# Patient Record
Sex: MALE | Race: AMERICAN INDIAN OR ALASKA NATIVE
[De-identification: names, ages, dates, MRNs, and addresses within clinical notes are randomized per-mention and may not be internally consistent; named-entity substitution may affect disease eponyms.]

---

## 2019-09-11 ENCOUNTER — HOSPITAL ENCOUNTER (OUTPATIENT)
Dept: HOSPITAL 5 - GIO | Age: 54
Discharge: HOME | End: 2019-09-11
Attending: INTERNAL MEDICINE
Payer: OTHER GOVERNMENT

## 2019-09-11 VITALS — DIASTOLIC BLOOD PRESSURE: 80 MMHG | SYSTOLIC BLOOD PRESSURE: 130 MMHG

## 2019-09-11 DIAGNOSIS — Z79.899: ICD-10-CM

## 2019-09-11 DIAGNOSIS — Z91.013: ICD-10-CM

## 2019-09-11 DIAGNOSIS — G47.30: ICD-10-CM

## 2019-09-11 DIAGNOSIS — E66.9: ICD-10-CM

## 2019-09-11 DIAGNOSIS — K64.8: ICD-10-CM

## 2019-09-11 DIAGNOSIS — Z79.84: ICD-10-CM

## 2019-09-11 DIAGNOSIS — D12.2: Primary | ICD-10-CM

## 2019-09-11 DIAGNOSIS — I10: ICD-10-CM

## 2019-09-11 DIAGNOSIS — Z88.8: ICD-10-CM

## 2019-09-11 DIAGNOSIS — K63.5: ICD-10-CM

## 2019-09-11 DIAGNOSIS — K31.89: ICD-10-CM

## 2019-09-11 DIAGNOSIS — Z80.0: ICD-10-CM

## 2019-09-11 DIAGNOSIS — K29.70: ICD-10-CM

## 2019-09-11 DIAGNOSIS — E11.9: ICD-10-CM

## 2019-09-11 DIAGNOSIS — K20.9: ICD-10-CM

## 2019-09-11 LAB
BASOPHILS # (AUTO): 0 K/MM3 (ref 0–0.1)
BASOPHILS NFR BLD AUTO: 0.3 % (ref 0–1.8)
EOSINOPHIL # BLD AUTO: 0.2 K/MM3 (ref 0–0.4)
EOSINOPHIL NFR BLD AUTO: 2.8 % (ref 0–4.3)
HCT VFR BLD CALC: 34.9 % (ref 35.5–45.6)
HGB BLD-MCNC: 11.9 GM/DL (ref 11.8–15.2)
LYMPHOCYTES # BLD AUTO: 1.9 K/MM3 (ref 1.2–5.4)
LYMPHOCYTES NFR BLD AUTO: 32.5 % (ref 13.4–35)
MCHC RBC AUTO-ENTMCNC: 34 % (ref 32–34)
MCV RBC AUTO: 84 FL (ref 84–94)
MONOCYTES # (AUTO): 0.4 K/MM3 (ref 0–0.8)
MONOCYTES % (AUTO): 7.3 % (ref 0–7.3)
PLATELET # BLD: 311 K/MM3 (ref 140–440)
RBC # BLD AUTO: 4.17 M/MM3 (ref 3.65–5.03)

## 2019-09-11 PROCEDURE — 82962 GLUCOSE BLOOD TEST: CPT

## 2019-09-11 PROCEDURE — 88342 IMHCHEM/IMCYTCHM 1ST ANTB: CPT

## 2019-09-11 PROCEDURE — 88305 TISSUE EXAM BY PATHOLOGIST: CPT

## 2019-09-11 PROCEDURE — 36415 COLL VENOUS BLD VENIPUNCTURE: CPT

## 2019-09-11 PROCEDURE — 85025 COMPLETE CBC W/AUTO DIFF WBC: CPT

## 2019-09-11 NOTE — OPERATIVE REPORT
PROCEDURE:  Esophagogastroduodenoscopy with biopsy.



INDICATIONS:  The patient is a 54-year-old slightly obese -American

gentleman with an underlying history of diabetes mellitus, prior history of H.

pylori gastritis, family history of stomach cancer.  The patient lately has been

complaining of some dyspeptic symptoms and some GI bleeding.  EGD was done to

make sure there was not any significant upper GI pathology present, accounting

for his symptoms.



DESCRIPTION OF PROCEDURE:  The procedure was done after getting informed consent

with MAC anesthesia.  Instrument was passed through the hypopharynx into the

esophagus, which showed mild to moderate distal erosive esophagitis.  Biopsy was

done from the distal esophagus.  Stomach showed antral gastritis.  Additional

biopsy was done from the gastric antrum, gastric body and angular incisura to

rule out for H. pylori.  There were no gastric ulcers noted.  The pylorus was

patent.  The duodenum in the first and second portion appeared normal.  There

was no evidence of any active bleeding within the gastric or the duodenal lumen.



ASSESSMENT:  Dyspepsia mild to moderate distal erosive esophagitis, gastritis,

no peptic ulcer disease noted.



PLAN:  To treat the patient with PPI and to do a colonoscopy for further

assessment.  CBC will also be done to check for the patient's hemoglobin and if

warranted, a pill endoscopy may also be done if needed.



Procedure was done in the GI lab with assistance of the GI lab team, which

included Elena DUPONT as well as Alicia lucero and with the assistance of

anesthesia.  Again, there was minimal bleeding associated with the procedure. 

No complications associated with the procedure.





DD: 09/11/2019 11:52

DT: 09/11/2019 13:24

JOB# [id number]  [id number]

AUBREY/JASE

## 2019-09-11 NOTE — OPERATIVE REPORT
PROCEDURE:  Colonoscopy with biopsy.



INDICATIONS:  A 54-year-old slightly obese -American gentleman with an

underlying history of diabetes, hypertension, who had been having some dyspeptic

symptoms and also complained of some GI bleeding.  EGD showed presence of

mild-to-moderate distal erosive esophagitis and gastritis, but no peptic ulcer

disease was noted.  There was no evidence of any active upper GI bleeding noted.



PROCEDURE IN DETAIL:  Colonoscopy was done after getting informed consent with

MAC anesthesia.  Initial rectal exam was unremarkable.  Instrument was passed

through the rectum onto the cecum, which was identified with ileocecal valve and

the appendiceal orifice.  Visualization was fair.  The scope was retroflexed in

the cecum.  No additional pathology was noted in the cecum and the scope was

then reintroduced and withdrawn to the hepatic flexure and reintroduced to the

cecum.  There were 2 polyps noted in the ascending colon, which were about 8-9

mm in diameter.  These were removed by cold biopsy.  The remaining part, most of

the transverse colon showed normal mucosa and there was another 8 mm polyp noted

in the distal transverse colon that was removed by cold biopsy.  The remaining

part of the left colon showed normal mucosa.  There were no polyps or

diverticular disease noted.  There was no evidence of bleeding within the colon

lumen and the rectum showed mild to moderate internal hemorrhoid, which may have

been the cause of the patient's bleeding, but there was no evidence of any

bleeding at the moment.



ASSESSMENT:  Multiple small polyps, two in the proximal colon and one in the

distal transverse colon, mild to moderate internal hemorrhoid.  No diverticular

disease noted.  There was minimal bleeding from the biopsy sites.  No

complications associated with the procedure.  The patient will be treated with

PPI because of the upper GI findings of moderate distal erosive esophagitis,

gastritis and also will be asked to take over-the-counter anti-hemorrhoidal

medication, avoid aspirin and aspirin-related products for the next few days. 

CBC will be checked to see the patient's hemoglobin and if the hemoglobin is low

then possibly a capsule endoscopy can be done as an outpatient.  The patient

will be asked to follow up in the office in 1-2 weeks' time.



The procedure was done in the GI lab with assistance of the GI lab team, which

included RN, Elena Rosenberg, as well as Alicia lucero and assistance of

anesthesia.





DD: 09/11/2019 11:55

DT: 09/11/2019 13:48

JOB# [id number]  [id number]

AUBREY/JASE

## 2019-09-11 NOTE — ANESTHESIA CONSULTATION
Anesthesia Consult and Med Hx


Date of service: 09/11/19





- Pre-Operative Health Status


ASA Pre-Surgery Classification: ASA3


Proposed Anesthetic Plan: MAC





- Pulmonary


Hx Sleep Apnea: Yes





- Cardiovascular System


Hx Hypertension: Yes





- Endocrine


Hx Non-Insulin Dependent Diabetes: Yes





- Hematic


Hx Sickle Cell Disease: No





- Other Systems


Hx Obesity: Yes

## 2019-09-11 NOTE — PROCEDURE NOTE
Date of procedure: 09/11/19


Pre-op diagnosis: Dyspepsia/Colon Polyp Screening/H/O GI Bleeding


Post-op diagnosis: other (Mild to Moderate Distal Erosive 

Esophagitis/Gastritis/No Peptic Ulcer Disease noted/No Diverticular disease 

noted/Multiple,Small Colon Polyps/ Mild to Moderate Internal Hemorrhoid)


Procedure: 





EGD with Biopsy and Colonoscopy with Biopsy


Anesthesia: [facility]


Surgeon: YUSRA SCHAFFER


Estimated blood loss: minimal


Pathology: list


Specimen disposition: to lab


Condition: stable


Disposition: same day (Check CBC.Treat with PPI and have patient use OTC 

antihemorrhoidal medication.  Follow up in 1 to 2 weeks ([phone]).)